# Patient Record
Sex: FEMALE | Race: WHITE | NOT HISPANIC OR LATINO | Employment: UNEMPLOYED | RURAL
[De-identification: names, ages, dates, MRNs, and addresses within clinical notes are randomized per-mention and may not be internally consistent; named-entity substitution may affect disease eponyms.]

---
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
## 2022-01-10 ENCOUNTER — OFFICE VISIT (OUTPATIENT)
Dept: FAMILY MEDICINE | Facility: CLINIC | Age: 22
End: 2022-01-10
Payer: COMMERCIAL

## 2022-01-10 VITALS
DIASTOLIC BLOOD PRESSURE: 58 MMHG | RESPIRATION RATE: 18 BRPM | BODY MASS INDEX: 27.98 KG/M2 | TEMPERATURE: 98 F | WEIGHT: 153 LBS | SYSTOLIC BLOOD PRESSURE: 128 MMHG | HEART RATE: 64 BPM | OXYGEN SATURATION: 99 %

## 2022-01-10 DIAGNOSIS — R05.9 COUGH: ICD-10-CM

## 2022-01-10 DIAGNOSIS — U07.1 COVID-19: Primary | ICD-10-CM

## 2022-01-10 LAB
CTP QC/QA: YES
FLUAV AG NPH QL: NEGATIVE
FLUBV AG NPH QL: POSITIVE
SARS-COV-2 AG RESP QL IA.RAPID: POSITIVE

## 2022-01-10 PROCEDURE — 87428 POCT SARS-COV2 (COVID) WITH FLU ANTIGEN: ICD-10-PCS | Mod: QW,,, | Performed by: NURSE PRACTITIONER

## 2022-01-10 PROCEDURE — 99499 UNLISTED E&M SERVICE: CPT | Mod: ,,, | Performed by: NURSE PRACTITIONER

## 2022-01-10 PROCEDURE — 87428 SARSCOV & INF VIR A&B AG IA: CPT | Mod: QW,,, | Performed by: NURSE PRACTITIONER

## 2022-01-10 PROCEDURE — RUSHUWA RUSH UWA STUDENT OFFICE VISIT: Mod: ,,, | Performed by: NURSE PRACTITIONER

## 2022-01-10 PROCEDURE — RUSHUWA RUSH UWA STUDENT OFFICE VISIT: ICD-10-PCS | Mod: ,,, | Performed by: NURSE PRACTITIONER

## 2022-01-10 PROCEDURE — 99499 NO LOS: ICD-10-PCS | Mod: ,,, | Performed by: NURSE PRACTITIONER

## 2022-01-10 RX ORDER — AZITHROMYCIN 250 MG/1
TABLET, FILM COATED ORAL
Qty: 6 TABLET | Refills: 0 | Status: SHIPPED | OUTPATIENT
Start: 2022-01-10 | End: 2022-08-30 | Stop reason: ALTCHOICE

## 2022-01-10 RX ORDER — METHYLPREDNISOLONE 4 MG/1
TABLET ORAL
Qty: 21 TABLET | Refills: 0 | Status: SHIPPED | OUTPATIENT
Start: 2022-01-10 | End: 2022-08-30 | Stop reason: ALTCHOICE

## 2022-01-10 NOTE — PROGRESS NOTES
Janelle Vigil DNP   [address] [place] [address]     PATIENT NAME: Ariana Sexton  : 2000  DATE: 1/10/22  MRN: [id number]      Billing Provider: Janelle Vigil DNP  Level of Service:   Patient PCP Information     Provider PCP Type    TESSA Christopher General          Reason for Visit / Chief Complaint: Sore Throat and Rhinitis (Covid exposure)       Update PCP  Update Chief Complaint         History of Present Illness / Problem Focused Workflow     Ariana Sexton presents to the clinic with Sore Throat and Rhinitis (Covid exposure)     HPI    Review of Systems     Review of Systems     Medical / Social / Family History   No past medical history on file.    No past surgical history on file.    Social History  Ms.  reports that she has never smoked. She has never used smokeless tobacco. She reports that she does not drink alcohol and does not use drugs.    Family History  Ms.'s family history is not on file.    Medications and Allergies     Medications  No outpatient medications have been marked as taking for the 1/10/22 encounter (Office Visit) with Janelle Vigil DNP.     Current Facility-Administered Medications for the 1/10/22 encounter (Office Visit) with Janelle Vigil DNP   Medication Dose Route Frequency Provider Last Rate Last Admin    cefTRIAXone injection 1 g  1 g Intramuscular Once Veronika S Vasquez, FNP        dexamethasone injection 4 mg  4 mg Intramuscular Once Veronika S Vasquez, FNP        methylPREDNISolone acetate injection 40 mg  40 mg Intramuscular Once Veronika S Vasquez, FNP           Allergies  Review of patient's allergies indicates:  No Known Allergies    Physical Examination     Vitals:    01/10/22 1529   BP: (!) 128/58   Pulse: 64   Resp: 18   Temp: 98.2 °F (36.8 °C)     Physical Exam  Vitals and nursing note reviewed.   HENT:      Head: Normocephalic.      Nose: Congestion and rhinorrhea present.      Mouth/Throat:      Mouth: Mucous membranes are moist.       Pharynx: Posterior oropharyngeal erythema present.   Eyes:      Extraocular Movements: Extraocular movements intact.      Conjunctiva/sclera: Conjunctivae normal.      Pupils: Pupils are equal, round, and reactive to light.   Cardiovascular:      Rate and Rhythm: Normal rate and regular rhythm.      Pulses: Normal pulses.      Heart sounds: Normal heart sounds.   Pulmonary:      Effort: Pulmonary effort is normal.      Breath sounds: Normal breath sounds.   Musculoskeletal:      Cervical back: Normal range of motion.   Skin:     General: Skin is warm and dry.      Capillary Refill: Capillary refill takes less than 2 seconds.   Neurological:      General: No focal deficit present.      Mental Status: She is alert and oriented to person, place, and time. Mental status is at baseline.   Psychiatric:         Mood and Affect: Mood normal.         Behavior: Behavior normal.         Thought Content: Thought content normal.         Judgment: Judgment normal.          Assessment and Plan (including Health Maintenance)      Problem List  Smart Sets  Document Outside HM   :    Plan:         Health Maintenance Due   Topic Date Due    Hepatitis C Screening  Never done    Lipid Panel  Never done    COVID-19 Vaccine (1) Never done    HPV Vaccines (1 - 2-dose series) Never done    HIV Screening  Never done    TETANUS VACCINE  Never done    Influenza Vaccine (1) Never done       Problem List Items Addressed This Visit    None     Visit Diagnoses     Cough    -  Primary    Relevant Orders    POCT SARS-COV2 (COVID) with Flu Antigen (Completed)          The patient has no Health Maintenance topics of status Not Due    No future appointments.         Signature:  Janelle Vigil DNP      [address] [place] [address]    Date of encounter: 1/10/22

## 2022-01-10 NOTE — LETTER
January 10, 2022      [facility]  [address] [place] [address]  Phone: [phone]  Fax: [phone]       Patient: Ariana Sexton   YOB: 2000  Date of Visit: 01/10/2022    To Whom It May Concern:    Kevin Sexton  was at [facility] on 01/10/2022. The patient may return to work/school on 1/17/22 with no restrictions. If you have any questions or concerns, or if I can be of further assistance, please do not hesitate to contact me.    Sincerely,    Janelle Vigil, DNP

## 2022-04-04 ENCOUNTER — OFFICE VISIT (OUTPATIENT)
Dept: FAMILY MEDICINE | Facility: CLINIC | Age: 22
End: 2022-04-04
Payer: COMMERCIAL

## 2022-04-04 VITALS
BODY MASS INDEX: 28.35 KG/M2 | TEMPERATURE: 98 F | WEIGHT: 160 LBS | SYSTOLIC BLOOD PRESSURE: 121 MMHG | HEIGHT: 63 IN | RESPIRATION RATE: 18 BRPM | HEART RATE: 81 BPM | DIASTOLIC BLOOD PRESSURE: 73 MMHG

## 2022-04-04 DIAGNOSIS — A08.4 VIRAL GASTROENTERITIS: Primary | ICD-10-CM

## 2022-04-04 PROCEDURE — 3074F PR MOST RECENT SYSTOLIC BLOOD PRESSURE < 130 MM HG: ICD-10-PCS | Mod: CPTII,,, | Performed by: NURSE PRACTITIONER

## 2022-04-04 PROCEDURE — 1159F PR MEDICATION LIST DOCUMENTED IN MEDICAL RECORD: ICD-10-PCS | Mod: CPTII,,, | Performed by: NURSE PRACTITIONER

## 2022-04-04 PROCEDURE — 3078F PR MOST RECENT DIASTOLIC BLOOD PRESSURE < 80 MM HG: ICD-10-PCS | Mod: CPTII,,, | Performed by: NURSE PRACTITIONER

## 2022-04-04 PROCEDURE — 1159F MED LIST DOCD IN RCRD: CPT | Mod: CPTII,,, | Performed by: NURSE PRACTITIONER

## 2022-04-04 PROCEDURE — 3078F DIAST BP <80 MM HG: CPT | Mod: CPTII,,, | Performed by: NURSE PRACTITIONER

## 2022-04-04 PROCEDURE — 3008F BODY MASS INDEX DOCD: CPT | Mod: CPTII,,, | Performed by: NURSE PRACTITIONER

## 2022-04-04 PROCEDURE — 1160F PR REVIEW ALL MEDS BY PRESCRIBER/CLIN PHARMACIST DOCUMENTED: ICD-10-PCS | Mod: CPTII,,, | Performed by: NURSE PRACTITIONER

## 2022-04-04 PROCEDURE — RUSHUWA RUSH UWA STUDENT OFFICE VISIT: ICD-10-PCS | Mod: ,,, | Performed by: NURSE PRACTITIONER

## 2022-04-04 PROCEDURE — 3074F SYST BP LT 130 MM HG: CPT | Mod: CPTII,,, | Performed by: NURSE PRACTITIONER

## 2022-04-04 PROCEDURE — 3008F PR BODY MASS INDEX (BMI) DOCUMENTED: ICD-10-PCS | Mod: CPTII,,, | Performed by: NURSE PRACTITIONER

## 2022-04-04 PROCEDURE — 1160F RVW MEDS BY RX/DR IN RCRD: CPT | Mod: CPTII,,, | Performed by: NURSE PRACTITIONER

## 2022-04-04 PROCEDURE — RUSHUWA RUSH UWA STUDENT OFFICE VISIT: Mod: ,,, | Performed by: NURSE PRACTITIONER

## 2022-04-04 RX ORDER — ONDANSETRON 4 MG/1
8 TABLET, ORALLY DISINTEGRATING ORAL EVERY 6 HOURS PRN
Qty: 20 TABLET | Refills: 0 | Status: SHIPPED | OUTPATIENT
Start: 2022-04-04 | End: 2022-04-24

## 2022-04-04 RX ORDER — PROMETHAZINE HYDROCHLORIDE 25 MG/1
25 TABLET ORAL EVERY 6 HOURS PRN
Qty: 20 TABLET | Refills: 0 | Status: SHIPPED | OUTPATIENT
Start: 2022-04-04 | End: 2022-08-30 | Stop reason: ALTCHOICE

## 2022-04-04 NOTE — LETTER
April 4, 2022    Ariana Sexton  [address] [facility] [address]             [facility]  Family Medicine  [address] [facility] [address]  Phone: [phone]  Fax: [phone]   April 4, 2022     Patient: Ariana Sexton   YOB: 2000   Date of Visit: 4/4/2022       To Whom it May Concern:    Ariana Sexton was seen in my clinic on 4/4/2022. She may return to school on 4/6/2022.    Please excuse her from any classes or work missed.    If you have any questions or concerns, please don't hesitate to call.    Sincerely,         TESSA Christopher

## 2022-04-10 PROBLEM — A08.4 VIRAL GASTROENTERITIS: Status: ACTIVE | Noted: 2022-04-10

## 2022-04-10 NOTE — PROGRESS NOTES
TESSA Christopher   [address] [place] [address]     PATIENT NAME: Ariana Sexton  : 2000  DATE: 22  MRN: [id number]      Billing Provider: TESSA Christopher  Level of Service: MS OFFICE/OUTPT VISIT, EST, LEVL III, 20-29 MIN  Patient PCP Information     Provider PCP Type    TESSA Christopher General          Reason for Visit / Chief Complaint: Fatigue, Dizziness, Diarrhea, Headache, Nausea, and Sore Throat       Update PCP  Update Chief Complaint         History of Present Illness / Problem Focused Workflow     Ariana Sexton presents to the clinic with Fatigue, Dizziness, Diarrhea, Headache, Nausea, and Sore Throat     HPI    Review of Systems     Review of Systems   Gastrointestinal: Positive for diarrhea, nausea and vomiting.        Medical / Social / Family History   History reviewed. No pertinent past medical history.    History reviewed. No pertinent surgical history.    Social History  Ms.  reports that she has never smoked. She has never used smokeless tobacco. She reports that she does not drink alcohol and does not use drugs.    Family History  Ms.'s family history is not on file.    No flowsheet data found.        Medications and Allergies     Medications  No outpatient medications have been marked as taking for the 22 encounter (Office Visit) with TESSA Christopher.     Current Facility-Administered Medications for the 22 encounter (Office Visit) with TESSA Christopher   Medication Dose Route Frequency Provider Last Rate Last Admin    cefTRIAXone injection 1 g  1 g Intramuscular Once TESSA Christopher        dexamethasone injection 4 mg  4 mg Intramuscular Once TESSA Christopher        methylPREDNISolone acetate injection 40 mg  40 mg Intramuscular Once TESSA Christopher           Allergies  Review of patient's allergies indicates:  No Known Allergies    Physical Examination   /73 (BP Location: Left arm, Patient Position: Sitting)   Pulse 81   Temp 98.4  "°F (36.9 °C)   Resp 18   Ht 5' 3" (1.6 m)   Wt 72.6 kg (160 lb)   BMI 28.34 kg/m²   Physical Exam  Vitals and nursing note reviewed.   Constitutional:       Appearance: Normal appearance.   HENT:      Head: Normocephalic and atraumatic.      Right Ear: External ear normal.      Left Ear: External ear normal.      Nose: Nose normal.      Mouth/Throat:      Mouth: Mucous membranes are moist.      Pharynx: Oropharynx is clear.   Eyes:      Pupils: Pupils are equal, round, and reactive to light.   Cardiovascular:      Rate and Rhythm: Normal rate and regular rhythm.      Pulses: Normal pulses.      Heart sounds: Normal heart sounds. No murmur heard.    No gallop.   Pulmonary:      Effort: Pulmonary effort is normal.      Breath sounds: Normal breath sounds. No wheezing or rales.   Abdominal:      General: Abdomen is flat. Bowel sounds are normal. There is no distension.      Palpations: Abdomen is soft.      Tenderness: There is no abdominal tenderness.   Musculoskeletal:         General: Normal range of motion.      Cervical back: Normal range of motion and neck supple.   Skin:     General: Skin is warm and dry.      Capillary Refill: Capillary refill takes less than 2 seconds.   Neurological:      General: No focal deficit present.      Mental Status: She is alert and oriented to person, place, and time.   Psychiatric:         Mood and Affect: Mood normal.         Behavior: Behavior normal.          Assessment and Plan (including Health Maintenance)      Problem List  Smart Sets  Document Outside HM   :    Plan:         Health Maintenance Due   Topic Date Due    Hepatitis C Screening  Never done    Lipid Panel  Never done    COVID-19 Vaccine (1) Never done    HPV Vaccines (1 - 2-dose series) Never done    HIV Screening  Never done    TETANUS VACCINE  Never done    Influenza Vaccine (1) Never done       Problem List Items Addressed This Visit        ID    Viral gastroenteritis - Primary          The patient " has no Health Maintenance topics of status Not Due    No future appointments.     Follow up in about 3 months (around 7/4/2022), or if symptoms worsen or fail to improve.        Signature:  TESSA Christopher      [address] [place] [address]    Date of encounter: 4/4/22

## 2022-08-30 ENCOUNTER — OFFICE VISIT (OUTPATIENT)
Dept: FAMILY MEDICINE | Facility: CLINIC | Age: 22
End: 2022-08-30
Payer: COMMERCIAL

## 2022-08-30 VITALS
WEIGHT: 154 LBS | DIASTOLIC BLOOD PRESSURE: 89 MMHG | HEIGHT: 63 IN | HEART RATE: 62 BPM | BODY MASS INDEX: 27.29 KG/M2 | SYSTOLIC BLOOD PRESSURE: 129 MMHG | TEMPERATURE: 99 F | RESPIRATION RATE: 16 BRPM

## 2022-08-30 DIAGNOSIS — R50.9 FEVER, UNSPECIFIED FEVER CAUSE: ICD-10-CM

## 2022-08-30 DIAGNOSIS — U07.1 COVID-19: Primary | ICD-10-CM

## 2022-08-30 DIAGNOSIS — R09.89 UPPER RESPIRATORY SYMPTOM: ICD-10-CM

## 2022-08-30 DIAGNOSIS — J10.1 INFLUENZA A: ICD-10-CM

## 2022-08-30 LAB
CTP QC/QA: YES
FLUAV AG NPH QL: POSITIVE
FLUBV AG NPH QL: NEGATIVE
SARS-COV-2 AG RESP QL IA.RAPID: POSITIVE

## 2022-08-30 PROCEDURE — 3079F PR MOST RECENT DIASTOLIC BLOOD PRESSURE 80-89 MM HG: ICD-10-PCS | Mod: CPTII,,, | Performed by: NURSE PRACTITIONER

## 2022-08-30 PROCEDURE — 1160F RVW MEDS BY RX/DR IN RCRD: CPT | Mod: CPTII,,, | Performed by: NURSE PRACTITIONER

## 2022-08-30 PROCEDURE — 3008F BODY MASS INDEX DOCD: CPT | Mod: CPTII,,, | Performed by: NURSE PRACTITIONER

## 2022-08-30 PROCEDURE — 1159F PR MEDICATION LIST DOCUMENTED IN MEDICAL RECORD: ICD-10-PCS | Mod: CPTII,,, | Performed by: NURSE PRACTITIONER

## 2022-08-30 PROCEDURE — 87428 POCT SARS-COV2 (COVID) WITH FLU ANTIGEN: ICD-10-PCS | Mod: QW,,, | Performed by: NURSE PRACTITIONER

## 2022-08-30 PROCEDURE — 3008F PR BODY MASS INDEX (BMI) DOCUMENTED: ICD-10-PCS | Mod: CPTII,,, | Performed by: NURSE PRACTITIONER

## 2022-08-30 PROCEDURE — 3074F SYST BP LT 130 MM HG: CPT | Mod: CPTII,,, | Performed by: NURSE PRACTITIONER

## 2022-08-30 PROCEDURE — 3074F PR MOST RECENT SYSTOLIC BLOOD PRESSURE < 130 MM HG: ICD-10-PCS | Mod: CPTII,,, | Performed by: NURSE PRACTITIONER

## 2022-08-30 PROCEDURE — 3079F DIAST BP 80-89 MM HG: CPT | Mod: CPTII,,, | Performed by: NURSE PRACTITIONER

## 2022-08-30 PROCEDURE — 99214 PR OFFICE/OUTPT VISIT, EST, LEVL IV, 30-39 MIN: ICD-10-PCS | Mod: ,,, | Performed by: NURSE PRACTITIONER

## 2022-08-30 PROCEDURE — 87428 SARSCOV & INF VIR A&B AG IA: CPT | Mod: QW,,, | Performed by: NURSE PRACTITIONER

## 2022-08-30 PROCEDURE — 99214 OFFICE O/P EST MOD 30 MIN: CPT | Mod: ,,, | Performed by: NURSE PRACTITIONER

## 2022-08-30 PROCEDURE — 1159F MED LIST DOCD IN RCRD: CPT | Mod: CPTII,,, | Performed by: NURSE PRACTITIONER

## 2022-08-30 PROCEDURE — 87635 SARS-COV-2 (COVID-19) QUALITATIVE PCR: ICD-10-PCS | Mod: ,,, | Performed by: CLINICAL MEDICAL LABORATORY

## 2022-08-30 PROCEDURE — 87635 SARS-COV-2 COVID-19 AMP PRB: CPT | Mod: ,,, | Performed by: CLINICAL MEDICAL LABORATORY

## 2022-08-30 PROCEDURE — 1160F PR REVIEW ALL MEDS BY PRESCRIBER/CLIN PHARMACIST DOCUMENTED: ICD-10-PCS | Mod: CPTII,,, | Performed by: NURSE PRACTITIONER

## 2022-08-30 RX ORDER — AZITHROMYCIN 250 MG/1
TABLET, FILM COATED ORAL
Qty: 6 TABLET | Refills: 0 | Status: SHIPPED | OUTPATIENT
Start: 2022-08-30 | End: 2022-08-30

## 2022-08-30 RX ORDER — CETIRIZINE HYDROCHLORIDE 10 MG/1
10 TABLET ORAL DAILY
Qty: 30 TABLET | Refills: 11 | Status: SHIPPED | OUTPATIENT
Start: 2022-08-30 | End: 2023-01-31 | Stop reason: ALTCHOICE

## 2022-08-30 RX ORDER — ONDANSETRON 4 MG/1
8 TABLET, ORALLY DISINTEGRATING ORAL EVERY 6 HOURS PRN
Qty: 21 TABLET | Refills: 1 | Status: SHIPPED | OUTPATIENT
Start: 2022-08-30 | End: 2023-01-31 | Stop reason: ALTCHOICE

## 2022-08-30 RX ORDER — FLUTICASONE PROPIONATE 50 MCG
1 SPRAY, SUSPENSION (ML) NASAL DAILY
Qty: 16 G | Refills: 0 | Status: SHIPPED | OUTPATIENT
Start: 2022-08-30 | End: 2023-01-31 | Stop reason: ALTCHOICE

## 2022-08-30 RX ORDER — OSELTAMIVIR PHOSPHATE 75 MG/1
75 CAPSULE ORAL 2 TIMES DAILY
Qty: 10 CAPSULE | Refills: 0 | Status: SHIPPED | OUTPATIENT
Start: 2022-08-30 | End: 2022-08-30

## 2022-08-30 RX ORDER — CITALOPRAM 20 MG/1
20 TABLET, FILM COATED ORAL DAILY
COMMUNITY
Start: 2022-08-24 | End: 2023-01-31 | Stop reason: ALTCHOICE

## 2022-08-30 NOTE — PROGRESS NOTES
WILMA Turner   [facility] [address] [place] [address]  [phone]      PATIENT NAME: Ariana Sexton  : 2000  DATE: 22  MRN: [id number]      Billing Provider: WILMA Turner  Level of Service:   Patient PCP Information       Provider PCP Type    TESSA Christopher General            Reason for Visit / Chief Complaint: Sore Throat and nasal drainage (Sore throat and nasal congestion x 2 days-wants to be tested for Covid)       Update PCP  Update Chief Complaint         History of Present Illness / Problem Focused Workflow     Ariana Sexton presents to the clinic with Sore Throat and nasal drainage (Sore throat and nasal congestion x 2 days-wants to be tested for Covid)     Pt presents for sinus congestion and recent exposure.   COVID/Flu A positive      Advised to quarantine per updated CDC guidelines.  Advised COVID-19 is a virus and viruses do not respond to antibiotics nor do antibiotics prevent a bacterial infection from developing.   Advised steroids are not recommended for outpatient management of covid symptoms except in certain extreme or special circumstances for patients who require hospitalization or supplemental oxygen.     Treatment for COVID-19 is primarily supportive including rest, increase fluids, and the use of OTC meds to help alleviate symptoms.    For immune support take vitamin C, vitamin D, and zinc 50 mg daily.  Acetaminophen or ibuprofen as needed for body aches, headache, or fever.  Home O2 sat monitoring can be used if experiencing shortness of breath.   Advised to report to ED for evaluation of any new or worsening chest pain, shortness of breath, or other severe symptoms.  Call the office if you have other questions or concerns that arise.        Review of Systems     Review of Systems   Constitutional:  Positive for fatigue.   HENT:  Positive for nasal congestion,  sinus pressure/congestion and sore throat.    Respiratory:  Negative for cough and shortness of breath.    Cardiovascular:  Negative for chest pain.      Medical / Social / Family History   History reviewed. No pertinent past medical history.    History reviewed. No pertinent surgical history.    Social History  Ms. Sexton  reports that she has never smoked. She has never used smokeless tobacco. She reports that she does not drink alcohol and does not use drugs.    Family History  Ms. Sexton's family history is not on file.    Medications and Allergies     Medications  Outpatient Medications Marked as Taking for the 8/30/22 encounter (Office Visit) with WILMA Turner   Medication Sig Dispense Refill    citalopram (CELEXA) 20 MG tablet Take 20 mg by mouth once daily.       Current Facility-Administered Medications for the 8/30/22 encounter (Office Visit) with WILMA Turner   Medication Dose Route Frequency Provider Last Rate Last Admin    cefTRIAXone injection 1 g  1 g Intramuscular Once Veronika S Vasquez, FNP        dexamethasone injection 4 mg  4 mg Intramuscular Once Veronika S Vasquez, FNP        methylPREDNISolone acetate injection 40 mg  40 mg Intramuscular Once Veronika S Vasquez, FNP           Allergies  Review of patient's allergies indicates:  No Known Allergies    Physical Examination     Vitals:    08/30/22 1026   BP: 129/89   Pulse: 62   Resp: 16   Temp: 98.8 °F (37.1 °C)     Physical Exam  HENT:      Nose: Congestion present.   Eyes:      Pupils: Pupils are equal, round, and reactive to light.   Cardiovascular:      Rate and Rhythm: Normal rate and regular rhythm.      Heart sounds: No murmur heard.  Pulmonary:      Breath sounds: Normal breath sounds. No wheezing, rhonchi or rales.   Abdominal:      General: Bowel sounds are normal.   Musculoskeletal:         General: No swelling.      Cervical back: Normal range of motion and neck supple.   Neurological:      Mental Status: She is alert and oriented  to person, place, and time.        Assessment and Plan (including Health Maintenance)      Problem List  Smart Sets  Document Outside HM   :    Plan:         Health Maintenance Due   Topic Date Due    Hepatitis C Screening  Never done    Lipid Panel  Never done    COVID-19 Vaccine (1) Never done    HPV Vaccines (1 - 2-dose series) Never done    HIV Screening  Never done    TETANUS VACCINE  Never done       Problem List Items Addressed This Visit          ID    COVID-19 - Primary    Relevant Medications    cetirizine (ZYRTEC) 10 MG tablet    fluticasone propionate (FLONASE) 50 mcg/actuation nasal spray    Other Relevant Orders    COVID-19 Routine Screening     Other Visit Diagnoses       Upper respiratory symptom        Relevant Orders    POCT SARS-COV2 (COVID) with Flu Antigen (Completed)    COVID-19 Routine Screening    Influenza A        Fever, unspecified fever cause        Relevant Orders    COVID-19 Routine Screening            Health Maintenance Topics with due status: Not Due       Topic Last Completion Date    Influenza Vaccine Not Due       No future appointments.         Signature:  WILMA Turner [facility] [address] [place] [address]  [phone]    Date of encounter: 8/30/22

## 2022-08-30 NOTE — LETTER
[address] [facility] [address]  Phone: [phone]  Fax: [phone]          Return to Work/School    Patient: Ariana Sexton  YOB: 2000   Date: 08/30/2022     To Whom It May Concern:     Ariana Sexton was in contact with/seen in my office on 08/30/2022. COVID-19 is present in our communities across the [facility]. She may return to work/school on 09/03/2022.     If you have any questions or concerns, or if I can be of further assistance, please do not hesitate to contact me.     Sincerely,    CECILLE TurnerP

## 2022-08-31 LAB — SARS-COV-2 RNA RESP QL NAA+PROBE: NEGATIVE

## 2023-01-31 ENCOUNTER — OFFICE VISIT (OUTPATIENT)
Dept: FAMILY MEDICINE | Facility: CLINIC | Age: 23
End: 2023-01-31
Payer: COMMERCIAL

## 2023-01-31 VITALS
RESPIRATION RATE: 16 BRPM | HEIGHT: 63 IN | HEART RATE: 71 BPM | OXYGEN SATURATION: 99 % | TEMPERATURE: 98 F | WEIGHT: 167 LBS | SYSTOLIC BLOOD PRESSURE: 118 MMHG | DIASTOLIC BLOOD PRESSURE: 80 MMHG | BODY MASS INDEX: 29.59 KG/M2

## 2023-01-31 DIAGNOSIS — N89.8 VAGINAL ITCHING: Primary | ICD-10-CM

## 2023-01-31 DIAGNOSIS — Z91.89 AT RISK FOR SEXUALLY TRANSMITTED DISEASE DUE TO UNPROTECTED SEX: ICD-10-CM

## 2023-01-31 LAB
BACTERIA VAG QL WET PREP: ABNORMAL /HPF
BILIRUB SERPL-MCNC: NORMAL MG/DL
BLOOD URINE, POC: NORMAL
CLUE CELLS VAG QL WET PREP: ABNORMAL /HPF
COLOR, POC UA: YELLOW
GLUCOSE UR QL STRIP: NORMAL
KETONES UR QL STRIP: NORMAL
LEUKOCYTE ESTERASE URINE, POC: NORMAL
NITRITE, POC UA: NORMAL
PH, POC UA: 6
PROTEIN, POC: NORMAL
RBC #/AREA VAG WET PREP: ABNORMAL /HPF
SPECIFIC GRAVITY, POC UA: 1.03
SQUAMOUS EPITHELIALS WET WOUNT, GENITAL: ABNORMAL /HPF
T VAGINALIS VAG QL WET PREP: ABNORMAL /HPF
UROBILINOGEN, POC UA: 0.2
WBC CLUMPS WET MOUNT, GENITAL: ABNORMAL /HPF
WBC VAG QL WET PREP: ABNORMAL /HPF
YEAST VAG QL WET PREP: ABNORMAL /HPF

## 2023-01-31 PROCEDURE — 3008F PR BODY MASS INDEX (BMI) DOCUMENTED: ICD-10-PCS | Mod: CPTII,,, | Performed by: NURSE PRACTITIONER

## 2023-01-31 PROCEDURE — 87491 CHLMYD TRACH DNA AMP PROBE: CPT | Mod: ,,, | Performed by: CLINICAL MEDICAL LABORATORY

## 2023-01-31 PROCEDURE — 99212 OFFICE O/P EST SF 10 MIN: CPT | Mod: ,,, | Performed by: NURSE PRACTITIONER

## 2023-01-31 PROCEDURE — 81003 POCT URINALYSIS W/O SCOPE: ICD-10-PCS | Mod: QW,,, | Performed by: NURSE PRACTITIONER

## 2023-01-31 PROCEDURE — 87086 CULTURE, URINE: ICD-10-PCS | Mod: ,,, | Performed by: CLINICAL MEDICAL LABORATORY

## 2023-01-31 PROCEDURE — 87661 TRICHOMONAS VAGINALIS BY PCR: ICD-10-PCS | Mod: ,,, | Performed by: CLINICAL MEDICAL LABORATORY

## 2023-01-31 PROCEDURE — 3008F BODY MASS INDEX DOCD: CPT | Mod: CPTII,,, | Performed by: NURSE PRACTITIONER

## 2023-01-31 PROCEDURE — 99212 PR OFFICE/OUTPT VISIT, EST, LEVL II, 10-19 MIN: ICD-10-PCS | Mod: ,,, | Performed by: NURSE PRACTITIONER

## 2023-01-31 PROCEDURE — 3074F PR MOST RECENT SYSTOLIC BLOOD PRESSURE < 130 MM HG: ICD-10-PCS | Mod: CPTII,,, | Performed by: NURSE PRACTITIONER

## 2023-01-31 PROCEDURE — 87491 CHLAMYDIA/GONORRHOEAE(GC), PCR: ICD-10-PCS | Mod: ,,, | Performed by: CLINICAL MEDICAL LABORATORY

## 2023-01-31 PROCEDURE — 1159F MED LIST DOCD IN RCRD: CPT | Mod: CPTII,,, | Performed by: NURSE PRACTITIONER

## 2023-01-31 PROCEDURE — 87210 SMEAR WET MOUNT SALINE/INK: CPT | Mod: 59,,, | Performed by: CLINICAL MEDICAL LABORATORY

## 2023-01-31 PROCEDURE — 87591 CHLAMYDIA/GONORRHOEAE(GC), PCR: ICD-10-PCS | Mod: ,,, | Performed by: CLINICAL MEDICAL LABORATORY

## 2023-01-31 PROCEDURE — 87661 TRICHOMONAS VAGINALIS AMPLIF: CPT | Mod: ,,, | Performed by: CLINICAL MEDICAL LABORATORY

## 2023-01-31 PROCEDURE — 87086 URINE CULTURE/COLONY COUNT: CPT | Mod: ,,, | Performed by: CLINICAL MEDICAL LABORATORY

## 2023-01-31 PROCEDURE — 3079F PR MOST RECENT DIASTOLIC BLOOD PRESSURE 80-89 MM HG: ICD-10-PCS | Mod: CPTII,,, | Performed by: NURSE PRACTITIONER

## 2023-01-31 PROCEDURE — 1159F PR MEDICATION LIST DOCUMENTED IN MEDICAL RECORD: ICD-10-PCS | Mod: CPTII,,, | Performed by: NURSE PRACTITIONER

## 2023-01-31 PROCEDURE — 81003 URINALYSIS AUTO W/O SCOPE: CPT | Mod: QW,,, | Performed by: NURSE PRACTITIONER

## 2023-01-31 PROCEDURE — 3074F SYST BP LT 130 MM HG: CPT | Mod: CPTII,,, | Performed by: NURSE PRACTITIONER

## 2023-01-31 PROCEDURE — 87591 N.GONORRHOEAE DNA AMP PROB: CPT | Mod: ,,, | Performed by: CLINICAL MEDICAL LABORATORY

## 2023-01-31 PROCEDURE — 87210 WET PREP, GENITAL: ICD-10-PCS | Mod: 59,,, | Performed by: CLINICAL MEDICAL LABORATORY

## 2023-01-31 PROCEDURE — 3079F DIAST BP 80-89 MM HG: CPT | Mod: CPTII,,, | Performed by: NURSE PRACTITIONER

## 2023-01-31 NOTE — PROGRESS NOTES
"   WILMA Turner   [facility] [address] [place] [address]  [phone]      PATIENT NAME: Ariana Sexton  : 2000  DATE: 23  MRN: [id number]      Billing Provider: WILMA Turner  Level of Service:   Patient PCP Information       Provider PCP Type    TESSA Christopher General            Reason for Visit / Chief Complaint: No chief complaint on file.       Update PCP  Update Chief Complaint         History of Present Illness / Problem Focused Workflow     Ariana Sexton presents to the clinic with No chief complaint on file.     Pt presents for evaluation of possible STD. She reports occasional vaginal itching without discharge. She states she is not currently sexually active but "wants to be sure". Will send labs for cultures.       Review of Systems     Review of Systems   Constitutional:  Negative for fatigue and fever.   HENT:  Negative for nasal congestion and sore throat.    Eyes:  Negative for visual disturbance.   Respiratory:  Negative for chest tightness and shortness of breath.    Cardiovascular:  Negative for chest pain and leg swelling.   Gastrointestinal:  Negative for abdominal pain, change in bowel habit and change in bowel habit.   Endocrine: Negative for polydipsia, polyphagia and polyuria.   Genitourinary:  Negative for dysuria, hematuria, vaginal discharge, vaginal pain and vaginal dryness.   Musculoskeletal:  Negative for back pain and leg pain.   Integumentary:  Negative for rash.   Neurological:  Negative for dizziness, syncope, weakness and light-headedness.      Medical / Social / Family History   No past medical history on file.    No past surgical history on file.    Social History  Ms. Sexton  reports that she has never smoked. She has never used smokeless tobacco. She reports that she does not drink alcohol and does not use drugs.    Family History  Ms. Sexton's family " history is not on file.    Medications and Allergies     Medications  No outpatient medications have been marked as taking for the 1/31/23 encounter (Office Visit) with WILMA Turner.     Current Facility-Administered Medications for the 1/31/23 encounter (Office Visit) with WILMA Turner   Medication Dose Route Frequency Provider Last Rate Last Admin    dexamethasone injection 4 mg  4 mg Intramuscular Once Veronika S Vasquez, FNP        methylPREDNISolone acetate injection 40 mg  40 mg Intramuscular Once Veronika S Vasquez, FNP        [DISCONTINUED] cefTRIAXone injection 1 g  1 g Intramuscular Once Veronika S Vasquez, FNP           Allergies  Review of patient's allergies indicates:  No Known Allergies    Physical Examination     Vitals:    01/31/23 1526   BP: 118/80   Pulse: 71   Resp: 16   Temp: 97.8 °F (36.6 °C)     Physical Exam  Eyes:      Pupils: Pupils are equal, round, and reactive to light.   Cardiovascular:      Rate and Rhythm: Normal rate and regular rhythm.      Heart sounds: Normal heart sounds. No murmur heard.  Pulmonary:      Breath sounds: Normal breath sounds. No wheezing, rhonchi or rales.   Abdominal:      General: Bowel sounds are normal.   Musculoskeletal:         General: No swelling.      Cervical back: Normal range of motion and neck supple.   Skin:     General: Skin is warm and dry.   Neurological:      Mental Status: She is alert and oriented to person, place, and time.        Assessment and Plan (including Health Maintenance)      Problem List  Smart Sets  Document Outside HM   :    Plan:     1. Vaginal itching  -     Wet Prep, Genital; Future; Expected date: 01/31/2023  -     Urine culture    2. At risk for sexually transmitted disease due to unprotected sex  -     POCT URINALYSIS W/O SCOPE  -     Chlamydia/GC, PCR  -     Trichomonas vaginalis by PCR; Future; Expected date: 01/31/2023  -     Wet Prep, Genital; Future; Expected date: 01/31/2023  -     Urine culture         Health  Maintenance Due   Topic Date Due    Hepatitis C Screening  Never done    Lipid Panel  Never done    COVID-19 Vaccine (1) Never done    HPV Vaccines (1 - 2-dose series) Never done    HIV Screening  Never done    TETANUS VACCINE  Never done    Influenza Vaccine (1) Never done       Problem List Items Addressed This Visit    None  Visit Diagnoses       Vaginal itching    -  Primary    Relevant Orders    Wet Prep, Genital    Urine culture    At risk for sexually transmitted disease due to unprotected sex        Relevant Orders    POCT URINALYSIS W/O SCOPE (Completed)    Chlamydia/GC, PCR    Trichomonas vaginalis by PCR    Wet Prep, Genital    Urine culture            The patient has no Health Maintenance topics of status Not Due    No future appointments.         Signature:  WILMA Turner [facility] [address] [place] [address]  [phone]    Date of encounter: 1/31/23

## 2023-02-01 LAB
CHLAMYDIA BY PCR: NEGATIVE
N. GONORRHOEAE (GC) BY PCR: NEGATIVE
TRICHOMONAS NAT: NEGATIVE

## 2023-02-02 LAB — UA COMPLETE W REFLEX CULTURE PNL UR: NORMAL

## 2024-01-23 ENCOUNTER — OFFICE VISIT (OUTPATIENT)
Dept: FAMILY MEDICINE | Facility: CLINIC | Age: 24
End: 2024-01-23
Payer: COMMERCIAL

## 2024-01-23 VITALS
WEIGHT: 150 LBS | TEMPERATURE: 99 F | HEART RATE: 59 BPM | BODY MASS INDEX: 26.58 KG/M2 | SYSTOLIC BLOOD PRESSURE: 125 MMHG | HEIGHT: 63 IN | DIASTOLIC BLOOD PRESSURE: 85 MMHG | OXYGEN SATURATION: 99 % | RESPIRATION RATE: 16 BRPM

## 2024-01-23 DIAGNOSIS — J00 ACUTE NASOPHARYNGITIS: Primary | ICD-10-CM

## 2024-01-23 DIAGNOSIS — J02.9 SORETHROAT: ICD-10-CM

## 2024-01-23 PROBLEM — U07.1 COVID-19: Status: RESOLVED | Noted: 2022-01-10 | Resolved: 2024-01-23

## 2024-01-23 PROBLEM — A08.4 VIRAL GASTROENTERITIS: Status: RESOLVED | Noted: 2022-04-10 | Resolved: 2024-01-23

## 2024-01-23 PROBLEM — R05.9 COUGH: Status: RESOLVED | Noted: 2022-01-10 | Resolved: 2024-01-23

## 2024-01-23 LAB
CTP QC/QA: YES
S PYO RRNA THROAT QL PROBE: NEGATIVE

## 2024-01-23 PROCEDURE — 87880 STREP A ASSAY W/OPTIC: CPT | Mod: QW,,, | Performed by: NURSE PRACTITIONER

## 2024-01-23 PROCEDURE — 99212 OFFICE O/P EST SF 10 MIN: CPT | Mod: 25,,, | Performed by: NURSE PRACTITIONER

## 2024-01-23 PROCEDURE — 1160F RVW MEDS BY RX/DR IN RCRD: CPT | Mod: CPTII,,, | Performed by: NURSE PRACTITIONER

## 2024-01-23 PROCEDURE — 1159F MED LIST DOCD IN RCRD: CPT | Mod: CPTII,,, | Performed by: NURSE PRACTITIONER

## 2024-01-23 PROCEDURE — 3008F BODY MASS INDEX DOCD: CPT | Mod: CPTII,,, | Performed by: NURSE PRACTITIONER

## 2024-01-23 PROCEDURE — 96372 THER/PROPH/DIAG INJ SC/IM: CPT | Mod: ,,, | Performed by: NURSE PRACTITIONER

## 2024-01-23 PROCEDURE — 3074F SYST BP LT 130 MM HG: CPT | Mod: CPTII,,, | Performed by: NURSE PRACTITIONER

## 2024-01-23 PROCEDURE — 3079F DIAST BP 80-89 MM HG: CPT | Mod: CPTII,,, | Performed by: NURSE PRACTITIONER

## 2024-01-23 RX ORDER — BETAMETHASONE SODIUM PHOSPHATE AND BETAMETHASONE ACETATE 3; 3 MG/ML; MG/ML
6 INJECTION, SUSPENSION INTRA-ARTICULAR; INTRALESIONAL; INTRAMUSCULAR; SOFT TISSUE
Status: COMPLETED | OUTPATIENT
Start: 2024-01-23 | End: 2024-01-23

## 2024-01-23 RX ORDER — CEFTRIAXONE 1 G/1
1 INJECTION, POWDER, FOR SOLUTION INTRAMUSCULAR; INTRAVENOUS
Status: COMPLETED | OUTPATIENT
Start: 2024-01-23 | End: 2024-01-23

## 2024-01-23 RX ADMIN — BETAMETHASONE SODIUM PHOSPHATE AND BETAMETHASONE ACETATE 6 MG: 3; 3 INJECTION, SUSPENSION INTRA-ARTICULAR; INTRALESIONAL; INTRAMUSCULAR; SOFT TISSUE at 02:01

## 2024-01-23 RX ADMIN — CEFTRIAXONE 1 G: 1 INJECTION, POWDER, FOR SOLUTION INTRAMUSCULAR; INTRAVENOUS at 02:01

## 2024-01-23 NOTE — PROGRESS NOTES
WILMA Turner   [facility] [address] [place] [address]  [phone]      PATIENT NAME: Ariana Sexton  : 2000  DATE: 24  MRN: [id number]      Billing Provider: WILMA Turner  Level of Service:   Patient PCP Information       Provider PCP Type    TESSA Christopher General            Reason for Visit / Chief Complaint: Sore Throat       Update PCP  Update Chief Complaint         History of Present Illness / Problem Focused Workflow     Ariana Sexton presents to the clinic with Sore Throat     Pt presents with sinus congestion, pressure and cough. No fever, chills or bodyaches. No loss of taste or smell.     Recommend change toothbrush and bed linens  Avoid sharing food or drinks  Encourage oral hydration.           Review of Systems     Review of Systems   Constitutional:  Negative for chills, fatigue and fever.   HENT:  Positive for postnasal drip, sinus pressure/congestion and sore throat.    Respiratory:  Negative for cough, shortness of breath and wheezing.    Cardiovascular:  Negative for chest pain.   Gastrointestinal:  Negative for nausea and vomiting.   Neurological:  Negative for weakness and headaches.        Medical / Social / Family History   History reviewed. No pertinent past medical history.    History reviewed. No pertinent surgical history.    Social History  Ms. Sexton  reports that she has never smoked. She has never used smokeless tobacco. She reports that she does not drink alcohol and does not use drugs.    Family History  Ms. Sexton's family history is not on file.    Medications and Allergies     Medications  No outpatient medications have been marked as taking for the 24 encounter (Office Visit) with Janelle Traore ACNP.     Current Facility-Administered Medications for the 24 encounter (Office Visit) with Janelle Traore ACNP   Medication Dose Route  "Frequency Provider Last Rate Last Admin    [COMPLETED] betamethasone acetate-betamethasone sodium phosphate injection 6 mg  6 mg Intramuscular 1 time in Clinic/HOD Janelle TraoreCECILLEP   6 mg at 01/23/24 1441    [COMPLETED] cefTRIAXone injection 1 g  1 g Intramuscular 1 time in Clinic/HOD Janelle TraoreWILMA   1 g at 01/23/24 1442    dexamethasone injection 4 mg  4 mg Intramuscular Once VasquezVeronika FNP        methylPREDNISolone acetate injection 40 mg  40 mg Intramuscular Once Vasquez, TESSA Tyson           Allergies  Review of patient's allergies indicates:  No Known Allergies    Physical Examination     Vitals:    01/23/24 1350   BP: 125/85   Pulse: (!) 59   Resp: 16   Temp: 98.5 °F (36.9 °C)     Physical Exam  HENT:      Mouth/Throat:      Pharynx: Posterior oropharyngeal erythema present.   Eyes:      Pupils: Pupils are equal, round, and reactive to light.   Cardiovascular:      Rate and Rhythm: Normal rate and regular rhythm.      Heart sounds: Normal heart sounds. No murmur heard.  Pulmonary:      Breath sounds: Normal breath sounds. No wheezing, rhonchi or rales.   Abdominal:      General: Bowel sounds are normal.   Musculoskeletal:         General: No swelling.      Cervical back: Normal range of motion and neck supple.   Skin:     General: Skin is warm and dry.   Neurological:      Mental Status: She is alert and oriented to person, place, and time.          No results found for: "WBC", "HGB", "HCT", "MCV", "PLT"     No results found for: "NA", "K", "CL", "CO2", "GLU", "BUN", "CREATININE", "CALCIUM", "PROT", "ALBUMIN", "BILITOT", "ALKPHOS", "AST", "ALT", "ANIONGAP", "EGFRNORACEVR"   No results found for: "LABA1C", "HGBA1C"   No results found for: "CHOL"  No results found for: "HDL"  No results found for: "LDLCALC"  No results found for: "DLDL"  No results found for: "TRIG"  No results found for: "CHOLHDL"   No results found for: "TSH", "Y4AGFLV", "R3NLKFR", "THYROIDAB", "FREET4"     Assessment " and Plan (including Health Maintenance)      Problem List  Smart Sets  Document Outside HM   :    Plan:     1. Acute nasopharyngitis  -     cefTRIAXone injection 1 g  -     betamethasone acetate-betamethasone sodium phosphate injection 6 mg    2. Sorethroat  -     POCT rapid strep A         There are no Patient Instructions on file for this visit.     Health Maintenance Due   Topic Date Due    Hepatitis C Screening  Never done    Lipid Panel  Never done    COVID-19 Vaccine (1) Never done    HPV Vaccines (1 - 2-dose series) Never done    HIV Screening  Never done    TETANUS VACCINE  Never done    Pap Smear  Never done    Influenza Vaccine (1) Never done         The patient has no Health Maintenance topics of status Not Due    Future Appointments   Date Time Provider Department Center   1/23/2024  3:40 PM [facility]            Signature:  WILMA Turner [facility] [address] [place] [address]  [phone]    Date of encounter: 1/23/24

## 2024-11-18 ENCOUNTER — OFFICE VISIT (OUTPATIENT)
Dept: FAMILY MEDICINE | Facility: CLINIC | Age: 24
End: 2024-11-18
Payer: COMMERCIAL

## 2024-11-18 VITALS
OXYGEN SATURATION: 98 % | DIASTOLIC BLOOD PRESSURE: 84 MMHG | SYSTOLIC BLOOD PRESSURE: 128 MMHG | TEMPERATURE: 98 F | BODY MASS INDEX: 28.52 KG/M2 | WEIGHT: 155 LBS | HEIGHT: 62 IN | HEART RATE: 95 BPM

## 2024-11-18 DIAGNOSIS — J02.9 SORE THROAT: ICD-10-CM

## 2024-11-18 DIAGNOSIS — J01.00 ACUTE NON-RECURRENT MAXILLARY SINUSITIS: Primary | ICD-10-CM

## 2024-11-18 DIAGNOSIS — Z23 NEED FOR VACCINATION: ICD-10-CM

## 2024-11-18 DIAGNOSIS — R05.1 ACUTE COUGH: ICD-10-CM

## 2024-11-18 LAB
CTP QC/QA: YES
MOLECULAR STREP A: NEGATIVE

## 2024-11-18 PROCEDURE — 3008F BODY MASS INDEX DOCD: CPT | Mod: CPTII,,, | Performed by: NURSE PRACTITIONER

## 2024-11-18 PROCEDURE — 99213 OFFICE O/P EST LOW 20 MIN: CPT | Mod: 25,,, | Performed by: NURSE PRACTITIONER

## 2024-11-18 PROCEDURE — 90471 IMMUNIZATION ADMIN: CPT | Mod: ,,, | Performed by: NURSE PRACTITIONER

## 2024-11-18 PROCEDURE — 87651 STREP A DNA AMP PROBE: CPT | Mod: QW,,, | Performed by: NURSE PRACTITIONER

## 2024-11-18 PROCEDURE — 1159F MED LIST DOCD IN RCRD: CPT | Mod: CPTII,,, | Performed by: NURSE PRACTITIONER

## 2024-11-18 PROCEDURE — 96372 THER/PROPH/DIAG INJ SC/IM: CPT | Mod: 59,,, | Performed by: NURSE PRACTITIONER

## 2024-11-18 PROCEDURE — 3079F DIAST BP 80-89 MM HG: CPT | Mod: CPTII,,, | Performed by: NURSE PRACTITIONER

## 2024-11-18 PROCEDURE — 90715 TDAP VACCINE 7 YRS/> IM: CPT | Mod: JZ,,, | Performed by: NURSE PRACTITIONER

## 2024-11-18 PROCEDURE — 3074F SYST BP LT 130 MM HG: CPT | Mod: CPTII,,, | Performed by: NURSE PRACTITIONER

## 2024-11-18 RX ORDER — DOXYCYCLINE 100 MG/1
100 CAPSULE ORAL 2 TIMES DAILY
Qty: 14 CAPSULE | Refills: 0 | Status: SHIPPED | OUTPATIENT
Start: 2024-11-18 | End: 2024-11-25

## 2024-11-18 RX ORDER — BUPROPION HYDROCHLORIDE 150 MG/1
150 TABLET ORAL
COMMUNITY
Start: 2024-09-20

## 2024-11-18 RX ORDER — METHYLPREDNISOLONE 4 MG/1
TABLET ORAL
Qty: 21 TABLET | Refills: 0 | Status: SHIPPED | OUTPATIENT
Start: 2024-11-18

## 2024-11-18 RX ORDER — BETAMETHASONE SODIUM PHOSPHATE AND BETAMETHASONE ACETATE 3; 3 MG/ML; MG/ML
6 INJECTION, SUSPENSION INTRA-ARTICULAR; INTRALESIONAL; INTRAMUSCULAR; SOFT TISSUE
Status: COMPLETED | OUTPATIENT
Start: 2024-11-18 | End: 2024-11-18

## 2024-11-18 RX ORDER — BUPROPION HYDROCHLORIDE 300 MG/1
300 TABLET ORAL DAILY
COMMUNITY

## 2024-11-18 RX ORDER — CEFTRIAXONE 1 G/1
1 INJECTION, POWDER, FOR SOLUTION INTRAMUSCULAR; INTRAVENOUS
Status: COMPLETED | OUTPATIENT
Start: 2024-11-18 | End: 2024-11-18

## 2024-11-18 RX ADMIN — CEFTRIAXONE 1 G: 1 INJECTION, POWDER, FOR SOLUTION INTRAMUSCULAR; INTRAVENOUS at 11:11

## 2024-11-18 RX ADMIN — BETAMETHASONE SODIUM PHOSPHATE AND BETAMETHASONE ACETATE 6 MG: 3; 3 INJECTION, SUSPENSION INTRA-ARTICULAR; INTRALESIONAL; INTRAMUSCULAR; SOFT TISSUE at 11:11

## 2024-11-18 NOTE — LETTER
November 18, 2024    Ariana Sexton  [address] [address] [facility] [address]             [facility]  Family Medicine  [address] [address]  Phone: [phone]  Fax: [phone]   November 18, 2024     Patient: Ariana Sexton   YOB: 2000   Date of Visit: 11/18/2024       To Whom it May Concern:    Ariana Sexton was seen in my clinic on 11/18/2024. She may return to school on 11/19/2024 .    Please excuse her from any classes or work missed.    If you have any questions or concerns, please don't hesitate to call.    Sincerely,         Janelle Vigil, DNP

## 2024-11-18 NOTE — PROGRESS NOTES
Janelle Vigil DNP   [address] [place] [address]     PATIENT NAME: Ariana Sexton  : 2000  DATE: 24  MRN: [id number]      Billing Provider: Janelle Vigil DNP  Level of Service:   Patient PCP Information       None on File            Reason for Visit / Chief Complaint: Sore Throat, Cough, Nasal Congestion, and Fatigue       Update PCP  Update Chief Complaint         History of Present Illness / Problem Focused Workflow     Ariana Sexton presents to the clinic with Sore Throat, Cough, Nasal Congestion, and Fatigue     Sore Throat   Associated symptoms include coughing.   Cough  Associated symptoms include a sore throat.   Fatigue  Associated symptoms include coughing, fatigue and a sore throat.     Review of Systems     Review of Systems   Constitutional:  Positive for fatigue.   HENT:  Positive for sore throat.    Respiratory:  Positive for cough.       Medical / Social / Family History   History reviewed. No pertinent past medical history.    History reviewed. No pertinent surgical history.    Social History  Ms.  reports that she has never smoked. She has never been exposed to tobacco smoke. She has never used smokeless tobacco. She reports that she does not drink alcohol and does not use drugs.    Family History  Ms.'s family history is not on file.    Medications and Allergies     Medications  Outpatient Medications Marked as Taking for the 24 encounter (Office Visit) with Janelle Vigil DNP   Medication Sig Dispense Refill    buPROPion (WELLBUTRIN XL) 150 MG TB24 tablet Take 150 mg by mouth.      buPROPion (WELLBUTRIN XL) 300 MG 24 hr tablet Take 300 mg by mouth once daily.       Current Facility-Administered Medications for the 24 encounter (Office Visit) with Janelle Vigil DNP   Medication Dose Route Frequency Provider Last Rate Last Admin    [COMPLETED] betamethasone acetate-betamethasone sodium phosphate injection 6 mg  6 mg Intramuscular 1 time  "in Clinic/HOD Janelle Vigil MARTI   6 mg at 11/18/24 1141    [COMPLETED] cefTRIAXone injection 1 g  1 g Intramuscular 1 time in Clinic/HOD VigilLibertadJanellekapil MELTON DNP   1 g at 11/18/24 1141    dexamethasone injection 4 mg  4 mg Intramuscular Once Veronika Vasquez FNP        methylPREDNISolone acetate injection 40 mg  40 mg Intramuscular Once VasquezVeronika FNP        [COMPLETED] Tdap (BOOSTRIX) vaccine injection 0.5 mL  0.5 mL Intramuscular 1 time in Clinic/HOD Janelle Vigil DNP   0.5 mL at 11/18/24 1141       Allergies  Review of patient's allergies indicates:  No Known Allergies    Physical Examination   /84 (BP Location: Right arm, Patient Position: Sitting)   Pulse 95   Temp 98.2 °F (36.8 °C)   Ht 5' 2" (1.575 m)   Wt 70.3 kg (155 lb)   LMP 11/06/2024 (Approximate)   SpO2 98%   BMI 28.35 kg/m²    Physical Exam  Vitals and nursing note reviewed.   Constitutional:       Appearance: Normal appearance. She is normal weight. She is ill-appearing.   HENT:      Head: Normocephalic.      Ears:      Comments: Dull tM LT bulging     Nose: Congestion and rhinorrhea present.      Mouth/Throat:      Mouth: Mucous membranes are moist.      Pharynx: Posterior oropharyngeal erythema present.   Eyes:      Extraocular Movements: Extraocular movements intact.      Conjunctiva/sclera: Conjunctivae normal.      Pupils: Pupils are equal, round, and reactive to light.   Cardiovascular:      Rate and Rhythm: Normal rate and regular rhythm.      Pulses: Normal pulses.      Heart sounds: Normal heart sounds.   Pulmonary:      Effort: Pulmonary effort is normal.      Breath sounds: Normal breath sounds.   Musculoskeletal:         General: Normal range of motion.      Cervical back: Normal range of motion.   Lymphadenopathy:      Cervical: Cervical adenopathy present.   Skin:     General: Skin is warm and dry.      Capillary Refill: Capillary refill takes less than 2 seconds.   Neurological:      General: No focal " deficit present.      Mental Status: She is alert and oriented to person, place, and time. Mental status is at baseline.   Psychiatric:         Mood and Affect: Mood normal.         Behavior: Behavior normal.         Thought Content: Thought content normal.         Judgment: Judgment normal.        Assessment and Plan (including Health Maintenance)      Problem List  Smart Sets  Document Outside HM   :    Plan:         Health Maintenance Due   Topic Date Due    Hepatitis C Screening  Never done    Lipid Panel  Never done    HIV Screening  Never done    Pap Smear  Never done       Problem List Items Addressed This Visit          ENT    Acute non-recurrent maxillary sinusitis - Primary    Sore throat    Relevant Orders    POCT Strep A, Molecular (Completed)       Pulmonary    Acute cough       ID    Need for vaccination    Relevant Medications    Tdap (BOOSTRIX) vaccine injection 0.5 mL (Completed)       Health Maintenance Topics with due status: Not Due       Topic Last Completion Date    TETANUS VACCINE 11/18/2024    RSV Vaccine (Age 60+ and Pregnant patients) Not Due       No future appointments.         Signature:  Janelle Vigil, MARTI      [address] [place] [address]    Date of encounter: 11/18/24

## 2024-11-18 NOTE — Clinical Note
Request pap from dr hardy moreland  At [facility]  [place]  You will probably have to call to get a fax number I did not see one online website.

## 2024-11-19 ENCOUNTER — PATIENT OUTREACH (OUTPATIENT)
Facility: HOSPITAL | Age: 24
End: 2024-11-19
Payer: COMMERCIAL

## 2024-11-19 NOTE — PROGRESS NOTES
Population Health Chart Review & Patient Outreach Details    Updates Requested / Reviewed:  [x]  Care Team Updated  [x]  Care Everywhere Updated & Reviewed  [x]  [facility] & [facility] Reviewed  [x]   Reviewed  []  MIIX Reviewed    Health Maintenance Topics Addressed and Outreach Outcomes / Actions Taken:        Cervical Cancer Screening []  Pap Smear Scheduled in Primary Care or OBGYN    [x]  External Records Requested & Care Team Updated if Applicable       []  External Records Uploaded, Care Team Updated, & History Updated if Applicable    []  Patient Declined Scheduling Pap Smear    []  Patient Will Schedule with External Provider & Care Team Updated if Applicable

## 2024-11-19 NOTE — LETTER
AUTHORIZATION FOR RELEASE OF   CONFIDENTIAL INFORMATION    Dear Dr. Chavez,    We are seeing Ariana Sexton, date of birth 2000, in the clinic at No Department Specified. Bre Chavez MD is the patient's PCP. Ariana Sexton has an outstanding lab/procedure at the time we reviewed her chart. In order to help keep her health information updated, she has authorized us to request the following medical record(s):      ( X )  PAP SMEAR                                          (  X) most resent Lipid , HIV, Hep C            screening              Please fax records to Tracie Maya LPN, Care Coordinator at [phone].      If you have any questions, please contact Tracie at [phone]           Patient Name: Ariana Sexton  : 2000  Patient Phone #: [phone]            Ariana Sexton  MRN: [id number]  : 2000  Age: 24 y.o.  Sex: female         Patient/Legal Guardian Signature  This signature was collected at 2024    self       _______________________________   Printed Name/Relationship to Patient      Consent for Examination and Treatment: I hereby authorize the providers and employees of [facility] ([facility]) to provide medical treatment/services which includes, but is not limited to, performing and administering tests and diagnostic procedures that are deemed necessary, including, but not limited to, imaging examinations, blood tests and other laboratory procedures as may be required by the hospital, clinic, or may be ordered by my physician(s) or persons working under the general and/or special instructions of my physician(s).      I understand and agree that this consent covers all authorized persons, including but not limited to physicians, residents, nurse practitioners, physicians' assistants, specialists, consultants, student nurses, and independently contracted physicians, who are called upon by the physician in charge, to carry out the diagnostic procedures and medical or surgical  treatment.     I hereby authorize [facility] to retain or dispose of any specimens or tissue, should there be such remaining from any test or procedure.     I hereby authorize and give consent for [facility] providers and employees to take photographs, images or videotapes of such diagnostic, surgical or treatment procedures of Patient as may be required by [facility] or as may be ordered by a physician. I further acknowledge and agree that [facility] may use cameras or other devices for patient monitoring.     I am aware that the practice of medicine is not an exact science, and I acknowledge that no guarantees have been made to me as to the outcome of any tests, procedures or treatment.     Authorization for Release of Information: I understand that my insurance company and/or their agents may need information necessary to make determinations about payment/reimbursement. I hereby provide authorization to release to all insurance companies, their successors, assignees, other parties with whom they may have contracted, or others acting on their behalf, that are involved with payment for any hospital and/or clinic charges incurred by the patient, any information that they request and deem necessary for payment/reimbursement, and/or quality review.  I further authorize the release of my health information to physicians or other health care practitioners on staff who are involved in my health care now and in the future, and to other health care providers, entities, or institutions for the purpose of my continued care and treatment, including referrals.     REGISTRATION AUTHORIZATION  Form No. 50058 (Rev. 3/25/2024)    Page 1 of 3                       Medicare Patient's Certification and Authorization to Release Information and Payment Request:  I certify that the information given by me in applying for payment under Title XVIII of the Social Security Act is correct. I authorize any yao of medical or other information about me  to release to the [facility], or its intermediaries or carriers, any information needed for this or a related Medicare claim. I request that payment of authorized benefits be made on my behalf.     Assignment of Insurance Benefits:   I hereby authorize any and all insurance companies, health plans, defined   benefit plans, health insurers or any entity that is or may be responsible for payment of my medical expenses to pay all hospital and medical benefits now due, and to become due and payable to me under any hospital benefits, sick benefits, injury benefits or any other benefit for services rendered to me, including Major Medical Benefits, direct to [facility] and all independently contracted physicians. I assign any and all rights that I may have against any and all insurance companies, health plans, defined benefit plans, health insurers or any entity that is or may be responsible for payment of my medical expenses, including, but not limited to any right to appeal a denial of a claim, any right to bring any action, lawsuit, administrative proceeding, or other cause of action on my behalf. I specifically assign my right to pursue litigation against any and all insurance companies, health plans, defined benefit plans, health insurers or any entity that is or may be responsible for payment of my medical expenses based upon a refusal to pay charges.            E. Valuables: It is understood and agreed that [facility] is not liable for the damage to or loss of any money, jewelry,   documents, dentures, eye glasses, hearing aids, prosthetics, or other property of value.     F. Computer Equipment: I understand and agree that should I choose to use computer equipment owned by [facility] or if I choose to access the Internet via [facility]s network, I do so at my own risk. [facility] is not responsible for any damage to my computer equipment or to any damages of any type that might arise from my loss of equipment  or data.     G. Acceptance of Financial Responsibility:  I agree that in consideration of the services and   supplies that have been   or will be furnished to the patient, I am hereby obligated to pay all charges made for or on the account of the patient according to the standard rates (in effect at the time the services and supplies are delivered) established by [facility], including its Patient Financial Assistance Policy to the extent it is applicable. I understand that I am responsible for all charges, or portions thereof, not covered by insurance or other sources. Patient refunds will be distributed only after balances at all [facility] facilities are paid.     H. Communication Authorization:  I hereby authorize [facility] and its representatives, along with any billing service   or  who may work on their behalf, to contact me on   my cell phone and/or home phone using pre- recorded messages, artificial voice messages, automatic telephone dialing devices or other computer assisted technology, or by electronic      mail, text messaging, or by any other form of electronic communication. This includes, but is not limited to, appointment reminders, yearly physical exam reminders, preventive care reminders, patient campaigns, welcome calls, and calls about account balances on my account or any account on which I am listed as a guarantor. I understand I have the right to opt out of these communications at any time.      Relationship  Between  Facility and  Provider:      I understand that some, but not all, providers furnishing services to the patient are not employees or agents of [facility]. The patient is under the care and supervision of his/her attending physician, and it is the responsibility of the facility and its nursing staff to carry out the instructions of such physicians. It is the responsibility of the patient's physician/designee to obtain the patient's informed consent, when required, for medical  or surgical treatment, special diagnostic or therapeutic procedures, or hospital services rendered for the patient under the special instructions of the physician/designee.           REGISTRATION AUTHORIZATION  Form No. 88466 (Rev. 3/25/2024)    Page 2 of 3                       Immunizations: [facility] shares immunization information with state sponsored health departments to help you and your doctor keep track of your immunization records. By signing, you consent to have this information shared with the health department in your state:                                [place] - LINKS ([place] Immunization Network for Kids Statewide)                                [place] - MIIX ([place] Immunization Information eXchange)                                [place] - ImmPRINT (Immunization Patient Registry with Integrated Technology)     TERM: This authorization is valid for this and subsequent care/treatment I receive at [facility] and will remain valid unless/until revoked in writing by me.     [facility]: As used in this document, [facility] means all [facility] owned and managed facilities, including, but not limited to, all health centers, surgery centers, clinics, urgent care centers, and hospitals.         [facility] complies with applicable Federal civil rights laws and does not discriminate on the basis of race, color, national origin, age, disability, or sex.  ATENCIÓN: si habla español, tiene a briceno disposición servicios gratuitos de asistencia lingüística. Gregorio chow [phone].  CHÚ Ý: N?u b?n nói Ti?ng Vi?t, có các d?ch v? h? tr? ngôn ng? mi?n phí dành cho b?n. G?i s? [phone].        REGISTRATION AUTHORIZATION  Form No. 31349 (Rev. 3/25/2024)   Page 3 of 3

## 2024-11-28 LAB
CHLAMYDIA /N. GONORRHOEAE SCREEN: NEGATIVE
HUMAN PAPILLOMAVIRUS (HPV): POSITIVE
PAP RECOMMENDATION EXT: ABNORMAL
PAP SMEAR: ABNORMAL

## 2024-12-04 ENCOUNTER — PATIENT OUTREACH (OUTPATIENT)
Facility: HOSPITAL | Age: 24
End: 2024-12-04
Payer: COMMERCIAL

## 2024-12-05 NOTE — PROGRESS NOTES
Can you find out if she spoke with ob gyn about her results? Need to make sure she's had 3 HPV vaccines and repeat pap 12 months from this one.